# Patient Record
Sex: FEMALE | Race: WHITE | URBAN - METROPOLITAN AREA
[De-identification: names, ages, dates, MRNs, and addresses within clinical notes are randomized per-mention and may not be internally consistent; named-entity substitution may affect disease eponyms.]

---

## 2018-10-02 PROBLEM — R73.01 IFG (IMPAIRED FASTING GLUCOSE): Status: ACTIVE | Noted: 2018-10-02

## 2019-03-05 PROBLEM — E78.2 MIXED HYPERCHOLESTEROLEMIA AND HYPERTRIGLYCERIDEMIA: Status: ACTIVE | Noted: 2017-04-18

## 2019-03-05 PROBLEM — K21.9 GASTROESOPHAGEAL REFLUX DISEASE: Status: ACTIVE | Noted: 2017-04-18

## 2019-03-05 PROBLEM — R55 VASOVAGAL NEAR SYNCOPE: Status: ACTIVE | Noted: 2019-03-05

## 2019-09-05 ENCOUNTER — TELEPHONE (OUTPATIENT)
Dept: INFECTIOUS DISEASES | Age: 64
End: 2019-09-05

## 2019-09-11 ENCOUNTER — OFFICE VISIT (OUTPATIENT)
Dept: INFECTIOUS DISEASES | Age: 64
End: 2019-09-11
Payer: COMMERCIAL

## 2019-09-11 VITALS
BODY MASS INDEX: 31.42 KG/M2 | TEMPERATURE: 98.2 F | SYSTOLIC BLOOD PRESSURE: 147 MMHG | HEART RATE: 90 BPM | DIASTOLIC BLOOD PRESSURE: 96 MMHG | OXYGEN SATURATION: 95 % | HEIGHT: 71 IN | WEIGHT: 224.4 LBS

## 2019-09-11 DIAGNOSIS — L03.90 CELLULITIS, UNSPECIFIED CELLULITIS SITE: Primary | ICD-10-CM

## 2019-09-11 PROCEDURE — 99213 OFFICE O/P EST LOW 20 MIN: CPT | Performed by: INTERNAL MEDICINE

## 2019-09-11 RX ORDER — SODIUM CHLORIDE 9 MG/ML
10 INJECTION, SOLUTION INTRAVENOUS 2 TIMES DAILY
COMMUNITY
Start: 2019-09-03 | End: 2020-09-01

## 2019-09-11 RX ORDER — AMOXICILLIN AND CLAVULANATE POTASSIUM 875; 125 MG/1; MG/1
1 TABLET, FILM COATED ORAL 2 TIMES DAILY
COMMUNITY
Start: 2019-09-05 | End: 2020-03-05

## 2019-09-12 NOTE — PROGRESS NOTES
negative  ENDOCRINE:  negative  MUSCULOSKELETAL:  negative  NEUROLOGICAL:  negative  BEHAVIOR/PSYCH:  negative    BP (!) 147/96 (Site: Right Upper Arm, Position: Sitting, Cuff Size: Medium Adult)   Pulse 90   Temp 98.2 °F (36.8 °C) (Oral)   Ht 5' 11\" (1.803 m)   Wt 224 lb 6.4 oz (101.8 kg)   SpO2 95%   BMI 31.30 kg/m²       EXAM:  CONSTITUTIONAL:  awake, alert, cooperative, no apparent distress,  EYES: conjunctiva normal  ENT:  Normocephalic, without obvious abnormality, atraumatic,  LUNGS:  No increased work of breathing, good air exchange, clear to auscultation bilaterally, no crackles or wheezing  CARDIOVASCULAR:  regular rate and rhythm, normal S1 and S2,  no murmur noted  ABDOMEN:   normal bowel sounds, soft, non-distended, non-tender, no masses palpated, no hepatosplenomegally,   MUSCULOSKELETAL: Foot with skin breakdown no purulence  NEUROLOGIC:  Awake, alert, oriented to name, place and time  SKIN:  No rash      Data Review:  Reviewed  IMAGING:          Oneida Victor MD  9/12/2019  11:30 AM      This note was completed using a voice transcription system. Every effort was made to ensure accuracy. However, inadvertent computerized transcription errors may be present.

## 2019-09-18 ENCOUNTER — OFFICE VISIT (OUTPATIENT)
Dept: INFECTIOUS DISEASES | Age: 64
End: 2019-09-18
Payer: COMMERCIAL

## 2019-09-18 VITALS
HEIGHT: 71 IN | BODY MASS INDEX: 31.84 KG/M2 | WEIGHT: 227.4 LBS | SYSTOLIC BLOOD PRESSURE: 142 MMHG | HEART RATE: 104 BPM | DIASTOLIC BLOOD PRESSURE: 97 MMHG | TEMPERATURE: 97.8 F

## 2019-09-18 DIAGNOSIS — L03.90 CELLULITIS, UNSPECIFIED CELLULITIS SITE: Primary | ICD-10-CM

## 2019-09-18 PROCEDURE — 99213 OFFICE O/P EST LOW 20 MIN: CPT | Performed by: INTERNAL MEDICINE

## 2019-09-18 RX ORDER — DOXYCYCLINE HYCLATE 100 MG
100 TABLET ORAL 2 TIMES DAILY
Qty: 20 TABLET | Refills: 0 | Status: SHIPPED | OUTPATIENT
Start: 2019-09-18 | End: 2019-09-28

## 2019-09-18 NOTE — PROGRESS NOTES
[facility]    Follow-up NOTE           Visit date: 9/18/2019      Reason for visit /chief complaints   Cellulitis    Assessment:     Encounter Diagnosis   Name Primary?  Cellulitis, unspecified cellulitis site Yes    cellulitis and soft tissue infection of the dorsum of the left foot and leg, likely portal of entry of bacteria from skin incisions/wounds  Soft tissue gas likely secondary to recent infection and surgery  Recent soft tissue infection with gas formation of the dorsal aspect of the left foot and ankle region  Status post incision and drainage with debridement 08/02/2019 cultures positive for MSSA  Suspected peripheral vascular disease, will defer to [facility]    History of eczema  Gastroesophageal reflux disease  Sensorineural hearing loss  Question of some recent amnesia   Dyslipidemia  Urinary incontinence        Plan:     Patient has some discoloration of the feet which could be because of the vascular issues, refer her to vascular for evaluation  Order doxycycline. She was advised to start Doxy only if she develop any new redness or blister formation  f/u in 3 weeks    HPI:    Patient is 47-year-old female came in for follow-up. I am following her for left foot soft tissue infection. She recently completed a course of Augmentin vancomycin. previousCultures positive for MSSA. She also had coag negative except in the past.    No Fever chills, no cough /shortness of breath. No vomiting or diarrhea.   She is having some issues with nonhealing of the skin breakdown from the feet      Past Medical History:   Diagnosis Date    Acid reflux     Anxiety 1/6/2015    Depression     Elevated triglycerides with high cholesterol     Prolapsed bladder     Tension headache     Vasovagal near syncope 3/5/2019    Diagnosed with tilt table     Past Surgical History:   Procedure Laterality Date    BARTHOLIN GLAND CYST EXCISION      CATARACT REMOVAL Bilateral     HERNIA REPAIR      as

## 2019-09-23 ENCOUNTER — TELEPHONE (OUTPATIENT)
Dept: PULMONOLOGY | Age: 64
End: 2019-09-23

## 2020-03-25 PROBLEM — K21.9 ACID REFLUX: Status: RESOLVED | Noted: 2020-03-25 | Resolved: 2020-03-24

## 2021-11-18 PROBLEM — R87.612 LGSIL ON PAP SMEAR OF CERVIX: Status: ACTIVE | Noted: 2021-02-10
